# Patient Record
Sex: FEMALE | Race: WHITE | URBAN - METROPOLITAN AREA
[De-identification: names, ages, dates, MRNs, and addresses within clinical notes are randomized per-mention and may not be internally consistent; named-entity substitution may affect disease eponyms.]

---

## 2024-03-12 ENCOUNTER — TELEPHONE (OUTPATIENT)
Dept: GASTROENTEROLOGY | Facility: CLINIC | Age: 39
End: 2024-03-12

## 2024-03-12 ENCOUNTER — OFFICE VISIT (OUTPATIENT)
Dept: GASTROENTEROLOGY | Facility: CLINIC | Age: 39
End: 2024-03-12

## 2024-03-12 VITALS
DIASTOLIC BLOOD PRESSURE: 90 MMHG | WEIGHT: 189 LBS | BODY MASS INDEX: 31.49 KG/M2 | HEIGHT: 65 IN | SYSTOLIC BLOOD PRESSURE: 158 MMHG

## 2024-03-12 DIAGNOSIS — K64.9 HEMORRHOIDS, UNSPECIFIED HEMORRHOID TYPE: ICD-10-CM

## 2024-03-12 DIAGNOSIS — K52.9 FREQUENT STOOLS: ICD-10-CM

## 2024-03-12 DIAGNOSIS — K62.89 RECTAL PAIN: ICD-10-CM

## 2024-03-12 DIAGNOSIS — L29.0 RECTAL ITCHING: ICD-10-CM

## 2024-03-12 DIAGNOSIS — Z80.0 FAMILY HISTORY OF COLON CANCER: ICD-10-CM

## 2024-03-12 DIAGNOSIS — Z80.0 FAMILY HISTORY OF COLON CANCER: Primary | ICD-10-CM

## 2024-03-12 DIAGNOSIS — R10.11 RUQ PAIN: ICD-10-CM

## 2024-03-12 DIAGNOSIS — L29.0 ANAL ITCHING: Primary | ICD-10-CM

## 2024-03-12 PROCEDURE — 99204 OFFICE O/P NEW MOD 45 MIN: CPT | Performed by: INTERNAL MEDICINE

## 2024-03-12 RX ORDER — CETIRIZINE HYDROCHLORIDE 10 MG/1
10 TABLET ORAL DAILY
COMMUNITY

## 2024-03-12 RX ORDER — ERGOCALCIFEROL 1.25 MG/1
50000 CAPSULE ORAL WEEKLY
COMMUNITY
Start: 2024-02-03

## 2024-03-12 NOTE — TELEPHONE ENCOUNTER
Scheduled for:  Colonoscopy 35[address]  Provider Name:  Dr. Díaz  Date:  6/14/2024  Location:  [facility]  Sedation:  MAC  Time: 2:15 pm, arrival 1:15 pm  Prep:  Trilyte  Meds/Allergies Reconciled?:  Physician reviewed  Diagnosis with codes:  Family hx of colon cancer Z12.11; Rectal pain K62.89; Rectal itching L29.0  Was patient informed to call insurance with codes (Y/N):  Yes  Referral sent?:  Referral was sent at the time of electronic surgical scheduling.  EM or [facility] notified?:  I sent an electronic request to Endo Scheduling and received a confirmation today.    Medication Orders:  N/A  Misc Orders:  N/A     Further instructions given by staff:  Prep instructions were given to pt in the office, pt verbalized understanding.

## 2024-03-12 NOTE — PATIENT INSTRUCTIONS
For hemorrhoids:   1. Use wet towelettes (Kleenix, Tucks, Len) to clean the anal area after bowel movements and then pat dry the area with dry toilet paper.    2.  DO NOT rub the area with dry toilet paper. Sitz baths can be taken as necessary (30 minutes soaking in a tub of tepid water once or twice a day for perianal burning and/or itching)    3. Use hydrocortisone 1% twice a day for 14 days if having significant itching.     4. Start daily Metamucil and use one to two scoops per day.    5. Lifestyle modifications should include:  - Efforts should be made to avoid coffee, tomatoes, beer, cola, tea, peanuts, milk products, chocolate and grapes.   - Decrease moisture in the anal area: avoid tight-fitting clothing and opt for cotton undergarments  - Avoid excessive wiping of anal area or the use of astringent/perfumed cleansers  - When drying the anal area, use a soft towel (e.g. pre-moistened pads/”Wet Ones”) and a dabbing motion   - Keep anal area dry and clean  - May use zinc oxide (Desitin) - apply twice daily to clean, dry anal region   - May use diphenhydramine (Benadryl) at night (side effect: sleepiness) until local measures take effect    1. Schedule colonoscopy with golytely [Diagnosis: fam hx CRC, rectal pain and itching].    2.  bowel prep from pharmacy (split dose golytely). If your prescription is not available and/or is cost-prohibitive, please contact the office as soon as possible to ensure you receive a bowel prep before your procedure.     3. Continue all medications for procedure.    4. Read all bowel prep instructions carefully.    5. AVOID seeds, nuts, popcorn, and raw fruits and vegetables (cooked is okay) for 2-3 days before procedure.    6. If you start any NEW medication after your visit today, please notify us. Certain medications will need to be held before the procedure or the procedure cannot be performed.     7. You will need a ride home from your procedure since you are  receiving sedation. Please ensure you will have an available ride home or the procedure cannot be performed.

## 2024-03-12 NOTE — H&P
[facility] - Gastroenterology                                                                                                               Reason for consult: hemorrhoids, family history colon cancer     Requesting physician or provider: No primary care provider on file.    Chief Complaint   Patient presents with    Consult     Hemorrhoids for a few years; family hx of colon cancer in father; no prior colonoscopy        HPI:   Brenda Rosas is a 38 year old woman with history of hemorrhoids presenting for evaluation of hemorrhoids and family history of CRC.     She previously saw GI in 2021 (Dr. Lala):   Impression: Brenda Rosas is a 36YO female who presents today for rectal bleeding    Assessments:    ICD-10-CM   1. Rectal bleeding K62.5   2. Internal hemorrhoids K64.8   3. Skin tag of anus K64.4       Plan:  - One episode of rectal bleeding noted 8 months ago which resolved with 2 week course of steroid cream  - Examination is unremarkable today  - Augment fiber/water in diet  - Recommend patient contact office in the event of further rectal bleeding or development of other GI symptoms  - Patient's father was diagnosed with rectal cancer in his late 60s; I would advise patient to begin screening colonoscopy at age 38    Follow up: as needed or at age 38, whichever is sooner     Today's visit:   She notes hemorrhoids that have been present since she was pregnant. She has had 4 children in the past. She notes pain and pruritus from her hemorrhoids. She denies bleeding. She notes that stools have become more frequent. She used to have 1 BM a day, now she has 2-3 BMs a day. She notes that stools are solid. Her AM BMs are usually soft and stool gets harder throughout the day. She denies incomplete evacuation and straining. She notes some discomfort to her RUQ. She is not sure what brings this on. This does not  happen frequently and not always associated with meals. She denies nausea and vomiting. She denies heartburn and reflux. She denies unintentional weight loss. She notes that her father was diagnosed with colon cancer at age 61.     Prior endoscopies:  none    Soc:  -denies smoking  -denies EtOH  -denies thc, illicits     Wt Readings from Last 6 Encounters:   03/12/24 189 lb (85.7 kg)        History, Medications, Allergies, ROS:      History reviewed. No pertinent past medical history.   History reviewed. No pertinent surgical history.   Family Hx:   Family History   Problem Relation Age of Onset    Colon Cancer Father 61      Social History:   Social History     Socioeconomic History    Marital status:    Tobacco Use    Smoking status: Never    Smokeless tobacco: Never   Substance and Sexual Activity    Alcohol use: Not Currently    Drug use: Never        Medications (Active prior to today's visit):  Current Outpatient Medications   Medication Sig Dispense Refill    ergocalciferol 1.25 MG (60867 UT) Oral Cap Take 1 capsule (50,000 Units total) by mouth once a week.      cetirizine 10 MG Oral Tab Take 1 tablet (10 mg total) by mouth daily.      polyethylene glycol, PEG 3350-KCl-NaBcb-NaCl-NaSulf, 236 g Oral Recon Soln Take 4,000 mL by mouth As Directed. Take 2,000 mL the night before your procedure and 2,000 mL the morning of your procedure. 1 each 0       Allergies:  No Known Allergies    ROS:   CONSTITUTIONAL:  negative for fevers, rigors  EYES:  negative for diplopia   RESPIRATORY:  negative for severe shortness of breath  CARDIOVASCULAR:  negative for crushing sub-sternal chest pain  GASTROINTESTINAL:  see HPI  GENITOURINARY:  negative for dysuria or gross hematuria  INTEGUMENT/BREAST:  SKIN:  negative for jaundice   ALLERGIC/IMMUNOLOGIC:  negative for hay fever  ENDOCRINE:  negative for cold intolerance and heat intolerance  MUSCULOSKELETAL:  negative for joint effusion/severe erythema  BEHAVIOR/PSYCH:   negative for psychotic behavior    PHYSICAL EXAM:   Blood pressure 158/90, height 5' 5\" (1.651 m), weight 189 lb (85.7 kg).    Gen: appears comfortable and in no acute distress  HEENT: sclera appear anicteric, oropharynx clear, mucus membranes appear moist  CV: regular rate, the extremities are warm and well-perfused   Lung: no increased work of breathing, no conversational dyspnea   Abd: soft, non-tender exam in all quadrants without rigidity or guarding, non-distended, no masses palpated  NATANAEL: external hemorrhoid and skin tags, int hemorrhoids, no masses, no bleeding   Skin: no jaundice, no apparent rashes   Neuro: alert and interactive, no focal neuro deficits  Psych: cooperative, normal affect     Labs/Imaging:     Patient's pertinent labs and imaging were reviewed and discussed with patient today.      ASSESSMENT/PLAN:   Brenda Rosas is a 38 year old woman with history of hemorrhoids presenting for evaluation of hemorrhoids and family history of CRC.     She notes hemorrhoids since she had children. She has symptoms of pain and pruritus from hemorrhoids. She has not had any bleeding recently but previously saw [facility] in 2021 for bleeding. NATANAEL with external skin tags and internal hemorrhoids. Recommended hydrocortisone 1% cream, fiber supplementation and lifestyle modifications. We will plan for colonoscopy given her family history of dad with CRC at age 61 and her rectal pain and itching.     She also noted occasional RUQ pain. No clear pattern with meals. Could consider RUQ US in the future if continues.     Recommendations:  For hemorrhoids:   1. Use wet towelettes (Kleenix, Tucks, Len) to clean the anal area after bowel movements and then pat dry the area with dry toilet paper.    2.  DO NOT rub the area with dry toilet paper. Sitz baths can be taken as necessary (30 minutes soaking in a tub of tepid water once or twice a day for perianal burning and/or itching)    3. Use hydrocortisone 1% twice a  day for 14 days if having significant itching.     4. Start daily Metamucil and use one to two scoops per day.    5. Lifestyle modifications should include:  - Efforts should be made to avoid coffee, tomatoes, beer, cola, tea, peanuts, milk products, chocolate and grapes.   - Decrease moisture in the anal area: avoid tight-fitting clothing and opt for cotton undergarments  - Avoid excessive wiping of anal area or the use of astringent/perfumed cleansers  - When drying the anal area, use a soft towel (e.g. pre-moistened pads/”Wet Ones”) and a dabbing motion   - Keep anal area dry and clean  - May use zinc oxide (Desitin) - apply twice daily to clean, dry anal region   - May use diphenhydramine (Benadryl) at night (side effect: sleepiness) until local measures take effect    1. Schedule colonoscopy with golytely [Diagnosis: fam hx CRC, rectal pain and itching].    2.  bowel prep from pharmacy (split dose golytely). If your prescription is not available and/or is cost-prohibitive, please contact the office as soon as possible to ensure you receive a bowel prep before your procedure.     3. Continue all medications for procedure.    4. Read all bowel prep instructions carefully.    5. AVOID seeds, nuts, popcorn, and raw fruits and vegetables (cooked is okay) for 2-3 days before procedure.    6. If you start any NEW medication after your visit today, please notify us. Certain medications will need to be held before the procedure or the procedure cannot be performed.     7. You will need a ride home from your procedure since you are receiving sedation. Please ensure you will have an available ride home or the procedure cannot be performed.     I have discussed the risks, benefits, and alternatives to colonoscopy with the patient/primary decision maker [who demonstrated understanding], including but not limited to the risks of bleeding, infection, pain, as well as the risks of anesthesia and perforation all leading  to prolonged hospitalization, surgical intervention. I also specifically mentioned the miss rate of colonoscopy of 5-10% in the best of all circumstances. The patient has agreed to sign an informed consent and elected to proceed with colonoscopy with possible intervention [i.e. polypectomy, control of bleeding, dilatation etc.] as indicated.    Orders This Visit:  No orders of the defined types were placed in this encounter.      Meds This Visit:  Requested Prescriptions     Signed Prescriptions Disp Refills    polyethylene glycol, PEG 3350-KCl-NaBcb-NaCl-NaSulf, 236 g Oral Recon Soln 1 each 0     Sig: Take 4,000 mL by mouth As Directed. Take 2,000 mL the night before your procedure and 2,000 mL the morning of your procedure.       Imaging & Referrals:  None       Genevieve Díaz MD  [facility]  3/12/2024

## 2024-06-14 ENCOUNTER — ANESTHESIA (OUTPATIENT)
Dept: ENDOSCOPY | Age: 39
End: 2024-06-14
Payer: COMMERCIAL

## 2024-06-14 ENCOUNTER — ANESTHESIA EVENT (OUTPATIENT)
Dept: ENDOSCOPY | Age: 39
End: 2024-06-14
Payer: COMMERCIAL

## 2024-06-14 ENCOUNTER — HOSPITAL ENCOUNTER (OUTPATIENT)
Age: 39
Setting detail: HOSPITAL OUTPATIENT SURGERY
Discharge: HOME | End: 2024-06-14
Attending: INTERNAL MEDICINE | Admitting: INTERNAL MEDICINE
Payer: COMMERCIAL

## 2024-06-14 VITALS
BODY MASS INDEX: 31.16 KG/M2 | SYSTOLIC BLOOD PRESSURE: 132 MMHG | WEIGHT: 187 LBS | RESPIRATION RATE: 15 BRPM | OXYGEN SATURATION: 98 % | HEIGHT: 65 IN | HEART RATE: 86 BPM | DIASTOLIC BLOOD PRESSURE: 85 MMHG

## 2024-06-14 DIAGNOSIS — L29.0 RECTAL ITCHING: ICD-10-CM

## 2024-06-14 DIAGNOSIS — K62.89 RECTAL PAIN: ICD-10-CM

## 2024-06-14 DIAGNOSIS — Z80.0 FAMILY HISTORY OF COLON CANCER: ICD-10-CM

## 2024-06-14 LAB — B-HCG UR QL: NEGATIVE

## 2024-06-14 PROCEDURE — 81025 URINE PREGNANCY TEST: CPT

## 2024-06-14 RX ORDER — SODIUM CHLORIDE, SODIUM LACTATE, POTASSIUM CHLORIDE, CALCIUM CHLORIDE 600; 310; 30; 20 MG/100ML; MG/100ML; MG/100ML; MG/100ML
INJECTION, SOLUTION INTRAVENOUS CONTINUOUS
Status: DISCONTINUED | OUTPATIENT
Start: 2024-06-14 | End: 2024-06-14

## 2024-06-14 RX ORDER — LIDOCAINE HYDROCHLORIDE 10 MG/ML
INJECTION, SOLUTION EPIDURAL; INFILTRATION; INTRACAUDAL; PERINEURAL AS NEEDED
Status: DISCONTINUED | OUTPATIENT
Start: 2024-06-14 | End: 2024-06-14 | Stop reason: SURG

## 2024-06-14 RX ADMIN — SODIUM CHLORIDE, SODIUM LACTATE, POTASSIUM CHLORIDE, CALCIUM CHLORIDE: 600; 310; 30; 20 INJECTION, SOLUTION INTRAVENOUS at 13:47:00

## 2024-06-14 RX ADMIN — LIDOCAINE HYDROCHLORIDE 50 MG: 10 INJECTION, SOLUTION EPIDURAL; INFILTRATION; INTRACAUDAL; PERINEURAL at 13:47:00

## 2024-06-14 NOTE — ANESTHESIA PREPROCEDURE EVALUATION
Anesthesia PreOp Note    HPI:     Brenda Rosas is a 39 year old female who presents for preoperative consultation requested by: Genevieve Díaz MD    Date of Surgery: 6/14/2024    Procedure(s):  COLONOSCOPY  Indication: Family history of colon cancer/  Rectal pain/ Rectal itching    Relevant Problems   No relevant active problems       NPO:                         History Review:  There are no problems to display for this patient.      History reviewed. No pertinent past medical history.    History reviewed. No pertinent surgical history.    Medications Prior to Admission   Medication Sig Dispense Refill Last Dose    ergocalciferol 1.25 MG (22981 UT) Oral Cap Take 1 capsule (50,000 Units total) by mouth once a week.       cetirizine 10 MG Oral Tab Take 1 tablet (10 mg total) by mouth daily.   PRN    polyethylene glycol, PEG 3350-KCl-NaBcb-NaCl-NaSulf, 236 g Oral Recon Soln Take 4,000 mL by mouth As Directed. Take 2,000 mL the night before your procedure and 2,000 mL the morning of your procedure. 1 each 0      Current Facility-Administered Medications Ordered in Epic   Medication Dose Route Frequency Provider Last Rate Last Admin    lactated ringers infusion   Intravenous Continuous Genevivee Díaz MD         No current [facility]-ordered outpatient medications on file.       Allergies   Allergen Reactions    Seasonal ITCHING       Family History   Problem Relation Age of Onset    Colon Cancer Father 61     Social History     Socioeconomic History    Marital status:    Tobacco Use    Smoking status: Never    Smokeless tobacco: Never   Vaping Use    Vaping status: Never Used   Substance and Sexual Activity    Alcohol use: Not Currently    Drug use: Never       Available pre-op labs reviewed.             Vital Signs:  Body mass index is 31.12 kg/m².   height is 1.651 m (5' 5\") and weight is 84.8 kg (187 lb). Her blood pressure is 148/91 (abnormal) and her pulse is 103. Her  respiration is 17 and oxygen saturation is 100%.   Vitals:    06/07/24 1323 06/14/24 1335   BP:  (!) 148/91   Pulse:  103   Resp:  17   SpO2:  100%   Weight: 84.8 kg (187 lb)    Height: 1.651 m (5' 5\")         Anesthesia Evaluation     Patient summary reviewed and Nursing notes reviewed    No history of anesthetic complications   Airway   Mallampati: II  TM distance: >3 FB  Neck ROM: full  Dental - Dentition appears grossly intact     Pulmonary - negative ROS and normal exam   Cardiovascular - negative ROS and normal exam  Exercise tolerance: good    Neuro/Psych - negative ROS     GI/Hepatic/Renal    (+) bowel prep    Endo/Other - negative ROS   Abdominal                  Anesthesia Plan:   ASA:  2  Plan:   MAC  Plan Comments: I have discussed the anesthetic plan, major risks and alternatives with the patient and answered all questions. The patient desires to proceed with surgery and anesthesia as planned.     Informed Consent Plan and Risks Discussed With:  Patient      I have informed Brenda Rosas and/or legal guardian or family member of the nature of the anesthetic plan, benefits, risks including possible dental damage if relevant, major complications, and any alternative forms of anesthetic management.   All of the patient's questions were answered to the best of my ability. The patient desires the anesthetic management as planned.  Sher Hooks MD  6/14/2024 1:42 PM  Present on Admission:  **None**

## 2024-06-14 NOTE — H&P
History & Physical Examination    Patient Name: Brenda Rosas  MRN: [id number]  CSN: [id number]  YOB: 1950    Diagnosis: fam hx CRC, rectal pain and itching      Medications Prior to Admission   Medication Sig Dispense Refill Last Dose    ergocalciferol 1.25 MG (77145 UT) Oral Cap Take 1 capsule (50,000 Units total) by mouth once a week.       cetirizine 10 MG Oral Tab Take 1 tablet (10 mg total) by mouth daily.   PRN    polyethylene glycol, PEG 3350-KCl-NaBcb-NaCl-NaSulf, 236 g Oral Recon Soln Take 4,000 mL by mouth As Directed. Take 2,000 mL the night before your procedure and 2,000 mL the morning of your procedure. 1 each 0      Current Facility-Administered Medications   Medication Dose Route Frequency    lactated ringers infusion   Intravenous Continuous       Allergies:   Allergies   Allergen Reactions    Seasonal ITCHING       History reviewed. No pertinent past medical history.  History reviewed. No pertinent surgical history.  Family History   Problem Relation Age of Onset    Colon Cancer Father 61     Social History     Tobacco Use    Smoking status: Never    Smokeless tobacco: Never   Substance Use Topics    Alcohol use: Not Currently       SYSTEM Check if Review is Normal Check if Physical Exam is Normal If not normal, please explain:   HEENT [X ] [ X]    NECK  [X ] [ X]    HEART [X ] [ X]    LUNGS [X ] [ X]    ABDOMEN [X ] [ X]    EXTREMITIES [X ] [ X]    OTHER        I have discussed the risks and benefits and alternatives of the procedure with the patient/family.  They understand and agree to proceed with plan of care.   I have reviewed the History and Physical done within the last 30 days.  Any changes noted above.    Genevieve Díaz MD

## 2024-06-14 NOTE — DISCHARGE INSTRUCTIONS
Home Care Instructions for Colonoscopy  with Sedation    Diet:  - Resume your regular diet as tolerated unless otherwise instructed.  - Start with light meals to minimize bloating.  - Do not drink alcohol today.    Medication:  - If you have questions about resuming your normal medications, please contact your Primary Care Physician.    Activities:  - Take it easy today. Do not return to work today.  - Do not drive today.  - Do not operate any machinery today (including kitchen equipment).    Colonoscopy:  - You may notice some rectal \"spotting\" (a little blood on the toilet tissue) for a day or two after the exam. This is normal.  - If you experience any rectal bleeding (not spotting), persistent tenderness or sharp severe abdominal pains, oral temperature over 100 degrees Fahrenheit, light-headedness or dizziness, or any other problems, contact your doctor.    **If unable to reach your doctor, please go to the [facility] Emergency Room**    - Your referring physician will receive a full report of your examination.  - If you do not hear from your doctor's office within two weeks of your biopsy, please call them for your results.    You may be able to see your laboratory results in [employer] between 4 and 7 business days.  In some cases, your physician may not have viewed the results before they are released to [employer].  If you have questions regarding your results contact the physician who ordered the test/exam by phone or via [employer] by choosing \"Ask a Medical Question.\"

## 2024-06-14 NOTE — ANESTHESIA POSTPROCEDURE EVALUATION
Patient: Brenda Rosas    Procedure Summary       Date: 06/14/24 Room / Location: [facility] ENDOSCOPY 02 / [facility] ENDO    Anesthesia Start: 1347 Anesthesia Stop: 1419    Procedure: COLONOSCOPY Diagnosis:       Family history of colon cancer      Rectal pain      Rectal itching      (hemorrhoids)    Surgeons: Genevieve Díaz MD Anesthesiologist: Sher Hooks MD    Anesthesia Type: MAC ASA Status: 2            Anesthesia Type: MAC    Vitals Value Taken Time   /89 06/14/24 1418   Temp  06/14/24 1419   Pulse 94 06/14/24 1418   Resp 21 06/14/24 1418   SpO2 100 % 06/14/24 1418       [facility] AN Post Evaluation:   Patient Evaluated in PACU  Patient Participation: complete - patient participated  Level of Consciousness: awake and alert  Pain Management: adequate  Airway Patency:patent  Yes    Nausea/Vomiting: none  Cardiovascular Status: acceptable  Respiratory Status: acceptable and room air  Postoperative Hydration acceptable      Sher Hooks MD  6/14/2024 2:19 PM

## 2024-06-14 NOTE — OPERATIVE REPORT
COLONOSCOPY REPORT    Brenda Rosas     1985 Age 39 year old   PCP No primary care provider on file. Endoscopist Genevieve Díaz MD       Date of procedure: 24    Procedure: Colonoscopy     Pre-operative diagnosis: family hx colon cancer, rectal pain and itching     Post-operative diagnosis: hemorrhoids     Medications: MAC    Withdrawal time: 14 minutes    Procedure:  Informed consent was obtained from the patient after the risks of the procedure were discussed, including but not limited to bleeding, perforation, aspiration, infection, or possibility of a missed lesion. After discussions of the risks/benefits and alternatives to this procedure, as well as the planned sedation, the patient was placed in the left lateral decubitus position and begun on continuous blood pressure pulse oximetry and EKG monitoring and this was maintained throughout the procedure. Once an adequate level of sedation was obtained a digital rectal exam was completed. Then the lubricated tip of the [id number] diagnostic video colonoscope was inserted and advanced without difficulty to the cecum using water immersion and CO2 insufflation technique. The cecum was identified by localizing the trifold, the appendix and the ileocecal valve. Withdrawal was begun with thorough washing and careful examination of the colonic walls and folds. A routine second examination of the cecum/ascending colon was performed. Photodocumentation was obtained. The bowel prep was excellent. Views of the colon were excellent with washing. I then carefully withdrew the instrument from the patient who tolerated the procedure well.     Complications: none.    Findings:   1. No polyps noted.    2. Diverticulosis: none.    3. Ileocecal valve appeared healthy and normal.    4. The colonic mucosa throughout the colon showed normal vascular pattern, without evidence of angioectasias or inflammation.     5. A retroflexed view of the rectum  revealed small internal hemorrhoids.    6. NATANAEL: normal rectal tone, no masses palpated.     Impression:   Small internal hemorrhoids.   The colon was otherwise normal with glistening mucosa and intact vascular pattern.    Recommend:  Repeat colonoscopy in 5  years for screening given family history. If new signs or symptoms develop, colonoscopy may need to be repeated sooner.   High fiber diet.    >>>If tissue was obtained and you have not received your pathology results either by phone or letter within 2 weeks, please call our office at [phone].    Specimens: none    Blood loss: <1 ml

## (undated) DEVICE — Device: Brand: DUAL NARE NASAL CANNULAE FEMALE LUER CON 7FT O2 TUBE

## (undated) DEVICE — SYRINGE, LUER SLIP, STERILE, 60ML: Brand: MEDLINE

## (undated) DEVICE — KIT ENDO ORCAPOD 160/180/190

## (undated) DEVICE — KIT CLEAN ENDOKIT 1.1OZ GOWNX2

## (undated) NOTE — LETTER
[place] [address], [place], [place]    Authorization for Surgical Operation and Procedure                               I hereby authorize Genevieve Díaz MD, my physician and his/her assistants (if applicable), which may include medical students, residents, and/or fellows, to perform the following surgical operation/ procedure and administer such anesthesia as may be determined necessary by my physician: Operation/Procedure name (s) COLONOSCOPY on Brenda Rosas   2.   I recognize that during the surgical operation/procedure, unforeseen conditions may necessitate additional or different procedures than those listed above.  I, therefore, further authorize and request that the above-named surgeon, assistants, or designees perform such procedures as are, in their judgment, necessary and desirable.    3.   My surgeon/physician has discussed prior to my surgery the potential benefits, risks and side effects of this procedure; the likelihood of achieving goals; and potential problems that might occur during recuperation.  They also discussed reasonable alternatives to the procedure, including risks, benefits, and side effects related to the alternatives and risks related to not receiving this procedure.  I have had all my questions answered and I acknowledge that no guarantee has been made as to the result that may be obtained.    4.   Should the need arise during my operation/procedure, which includes change of level of care prior to discharge, I also consent to the administration of blood and/or blood products.  Further, I understand that despite careful testing and screening of blood or blood products by collecting agencies, I may still be subject to ill effects as a result of receiving a blood transfusion and/or blood products.  The following are some, but not all, of the potential risks that can occur: fever and allergic reactions, hemolytic reactions, transmission of  diseases such as Hepatitis, AIDS and Cytomegalovirus (CMV) and fluid overload.  In the event that I wish to have an autologous transfusion of my own blood, or a directed donor transfusion, I will discuss this with my physician.  Check only if Refusing Blood or Blood Products  I understand refusal of blood or blood products as deemed necessary by my physician may have serious consequences to my condition to include possible death. I hereby assume responsibility for my refusal and release the hospital, its personnel, and my physicians from any responsibility for the consequences of my refusal.    o  Refuse   5.   I authorize the use of any specimen, organs, tissues, body parts or foreign objects that may be removed from my body during the operation/procedure for diagnosis, research or teaching purposes and their subsequent disposal by hospital authorities.  I also authorize the release of specimen test results and/or written reports to my treating physician on the hospital medical staff or other referring or consulting physicians involved in my care, at the discretion of the Pathologist or my treating physician.    6.   I consent to the photographing or videotaping of the operations or procedures to be performed, including appropriate portions of my body for medical, scientific, or educational purposes, provided my identity is not revealed by the pictures or by descriptive texts accompanying them.  If the procedure has been photographed/videotaped, the surgeon will obtain the original picture, image, videotape or CD.  The hospital will not be responsible for storage, release or maintenance of the picture, image, tape or CD.    7.   I consent to the presence of a  or observers in the operating room as deemed necessary by my physician or their designees.    8.   I recognize that in the event my procedure results in extended X-Ray/fluoroscopy time, I may develop a skin reaction.    9. If I have a Do Not  Attempt Resuscitation (DNAR) order in place, that status will be suspended while in the operating room, procedural suite, and during the recovery period unless otherwise explicitly stated by me (or a person authorized to consent on my behalf). The surgeon or my attending physician will determine when the applicable recovery period ends for purposes of reinstating the DNAR order.  10. Patients having a sterilization procedure: I understand that if the procedure is successful the results will be permanent and it will therefore be impossible for me to inseminate, conceive, or bear children.  I also understand that the procedure is intended to result in sterility, although the result has not been guaranteed.   11. I acknowledge that my physician has explained sedation/analgesia administration to me including the risk and benefits I consent to the administration of sedation/analgesia as may be necessary or desirable in the judgment of my physician.    I CERTIFY THAT I HAVE READ AND FULLY UNDERSTAND THE ABOVE CONSENT TO OPERATION and/or OTHER PROCEDURE.     ____________________________________  _________________________________        ______________________________  Signature of Patient    Signature of Responsible Person                Printed Name of Responsible Person                                      ____________________________________  _____________________________                ________________________________  Signature of Witness        Date  Time         Relationship to Patient    STATEMENT OF PHYSICIAN My signature below affirms that prior to the time of the procedure; I have explained to the patient and/or his/her legal representative, the risks and benefits involved in the proposed treatment and any reasonable alternative to the proposed treatment. I have also explained the risks and benefits involved in refusal of the proposed treatment and alternatives to the proposed treatment and have answered the  patient's questions. If I have a significant financial interest in a co-management agreement or a significant financial interest in any product or implant, or other significant relationship used in this procedure/surgery, I have disclosed this and had a discussion with my patient.     _____________________________________________________              _____________________________  (Signature of Physician)                                                                                         (Date)                                   (Time)  Patient Name: Brenda Rosas      : 1985      Printed: 2024     Medical Record #: [id number]                                      Page 1 of 1

## (undated) NOTE — LETTER
[facility]  Administration of Anesthesia  Brenda HAZEL agree to be cared for by a physician anesthesiologist alone and/or with a nurse anesthetist, who is specially trained to monitor me and give me medicine to put me to sleep or keep me comfortable during my procedure    I understand that my anesthesiologist and/or anesthetist is not an employee or agent of [facility] or [employer]. He or she works for [facility] [facility]    As the patient asking for anesthesia services, I agree to:  Allow the anesthesiologist (anesthesia doctor) to give me medicine and do additional procedures as necessary. Some examples are: Starting or using an “IV” to give me medicine, fluids or blood during my procedure, and having a breathing tube placed to help me breathe when I’m asleep (intubation). In the event that my heart stops working properly, I understand that my anesthesiologist will make every effort to sustain my life, unless otherwise directed by [facility] Do Not Resuscitate documents.  Tell my anesthesia doctor before my procedure:  If I am pregnant.  The last time that I ate or drank.  iii. All of the medicines I take (including prescriptions, herbal supplements, and pills I can buy without a prescription (including street drugs/illegal medications). Failure to inform my anesthesiologist about these medicines may increase my risk of anesthetic complications.  iv.If I am allergic to anything or have had a reaction to anesthesia before.  I understand how the anesthesia medicine will help me (benefits).  I understand that with any type of anesthesia medicine there are risks:  The most common risks are: nausea, vomiting, sore throat, muscle soreness, damage to my eyes, mouth, or teeth (from breathing tube placement).  Rare risks include: remembering what happened during my procedure, allergic reactions to medications, injury to my airway, heart, lungs, vision, nerves, or  muscles and in extremely rare instances death.  My doctor has explained to me other choices available to me for my care (alternatives).  Pregnant Patients (“epidural”):  I understand that the risks of having an epidural (medicine given into my back to help control pain during labor), include itching, low blood pressure, difficulty urinating, headache or slowing of the baby’s heart. Very rare risks include infection, bleeding, seizure, irregular heart rhythms and nerve injury.  Regional Anesthesia (“spinal”, “epidural”, & “nerve blocks”):  I understand that rare but potential complications include headache, bleeding, infection, seizure, irregular heart rhythms, and nerve injury.    _____________________________________________________________________________  Patient (or Representative) Signature/Relationship to Patient  Date   Time    _____________________________________________________________________________   Name (if used)    Language/Organization   Time    _____________________________________________________________________________  Nurse Anesthetist Signature     Date   Time  _____________________________________________________________________________  Anesthesiologist Signature     Date   Time  I have discussed the procedure and information above with the patient (or patient’s representative) and answered their questions. The patient or their representative has agreed to have anesthesia services.    _____________________________________________________________________________  Witness        Date   Time  I have verified that the signature is that of the patient or patient’s representative, and that it was signed before the procedure  Patient Name: Brenda Rosas     : 1985                 Printed: 2024 at 7:46 AM    Medical Record #: [id number]                                            Page 1 of 1  ----------ANESTHESIA CONSENT----------